# Patient Record
Sex: FEMALE | Race: WHITE | URBAN - METROPOLITAN AREA
[De-identification: names, ages, dates, MRNs, and addresses within clinical notes are randomized per-mention and may not be internally consistent; named-entity substitution may affect disease eponyms.]

---

## 2020-09-30 ENCOUNTER — LAB OUTSIDE AN ENCOUNTER (OUTPATIENT)
Dept: URBAN - METROPOLITAN AREA CLINIC 40 | Facility: CLINIC | Age: 40
End: 2020-09-30

## 2020-09-30 ENCOUNTER — OFFICE VISIT (OUTPATIENT)
Dept: URBAN - METROPOLITAN AREA CLINIC 40 | Facility: CLINIC | Age: 40
End: 2020-09-30
Payer: COMMERCIAL

## 2020-09-30 DIAGNOSIS — R11.2 NAUSEA & VOMITING: ICD-10-CM

## 2020-09-30 DIAGNOSIS — R10.13 EPIGASTRIC PAIN: ICD-10-CM

## 2020-09-30 PROCEDURE — G8427 DOCREV CUR MEDS BY ELIG CLIN: HCPCS | Performed by: INTERNAL MEDICINE

## 2020-09-30 PROCEDURE — G8417 CALC BMI ABV UP PARAM F/U: HCPCS | Performed by: INTERNAL MEDICINE

## 2020-09-30 PROCEDURE — 99203 OFFICE O/P NEW LOW 30 MIN: CPT | Performed by: INTERNAL MEDICINE

## 2020-09-30 PROCEDURE — G9903 PT SCRN TBCO ID AS NON USER: HCPCS | Performed by: INTERNAL MEDICINE

## 2020-09-30 RX ORDER — PANTOPRAZOLE SODIUM 40 MG/1
1 TABLET TABLET, DELAYED RELEASE ORAL ONCE A DAY
Qty: 90 | Refills: 0 | OUTPATIENT
Start: 2020-09-30

## 2020-09-30 RX ORDER — SUCRALFATE 1 G/1
1 TABLET ON AN EMPTY STOMACH TABLET ORAL TWICE A DAY
Qty: 60 | OUTPATIENT
Start: 2020-09-30 | End: 2020-10-30

## 2020-09-30 NOTE — PHYSICAL EXAM GASTROINTESTINAL
Abdomen , soft, mild epigastric tenderness  nondistended , no guarding or rigidity , no masses palpable , normal bowel sounds , Liver and Spleen , no hepatomegaly present , no hepatosplenomegaly , liver nontender , spleen not palpable

## 2020-09-30 NOTE — HPI-TODAY'S VISIT:
Ms. Farnsworth is a 40-year-old white female seen as a self-referral for abdominal pain.  Patient states for years she has had intermittent episodes of epigastric pain.  She describes it as a gnawing pain that is worse with meals.  She has had some nausea and vomiting as well as occasional dysphagia with solid foods that she has had to cough up.  She states that sometimes the pain can be burning.  She does use ibuprofen once a month around her menstrual cycle.  She saw an ear nose and throat doctor who did a laryngoscopy and apparently told her she had reflux changes.  She tried Prilosec for short period of time.  She has had a recent work-up for heart palpitations with cardiology.  Testing including an event monitor was unremarkable.  She does admit to diet does have increased tomatoes onions garlic and citrus in it.  She has tried to go more gluten-free of late and feels like this is been slightly helpful.

## 2020-09-30 NOTE — PHYSICAL EXAM NEUROLOGIC:
oriented to person, place and time , normal sensation , short and long term memory intact All other review of systems negative, except as noted in HPI

## 2020-10-01 ENCOUNTER — TELEPHONE ENCOUNTER (OUTPATIENT)
Dept: URBAN - METROPOLITAN AREA CLINIC 92 | Facility: CLINIC | Age: 40
End: 2020-10-01

## 2020-10-02 ENCOUNTER — OFFICE VISIT (OUTPATIENT)
Dept: URBAN - METROPOLITAN AREA SURGERY CENTER 30 | Facility: SURGERY CENTER | Age: 40
End: 2020-10-02
Payer: COMMERCIAL

## 2020-10-02 ENCOUNTER — OFFICE VISIT (OUTPATIENT)
Dept: URBAN - METROPOLITAN AREA SURGERY CENTER 30 | Facility: SURGERY CENTER | Age: 40
End: 2020-10-02

## 2020-10-02 DIAGNOSIS — R10.13 ABDOMINAL DISCOMFORT, EPIGASTRIC: ICD-10-CM

## 2020-10-02 DIAGNOSIS — R13.19 CERVICAL DYSPHAGIA: ICD-10-CM

## 2020-10-02 DIAGNOSIS — K31.89 ACQUIRED DEFORMITY OF DUODENUM: ICD-10-CM

## 2020-10-02 DIAGNOSIS — K22.8 COLUMNAR-LINED ESOPHAGUS: ICD-10-CM

## 2020-10-02 PROCEDURE — 43450 DILATE ESOPHAGUS 1/MULT PASS: CPT | Performed by: INTERNAL MEDICINE

## 2020-10-02 PROCEDURE — G8907 PT DOC NO EVENTS ON DISCHARG: HCPCS | Performed by: INTERNAL MEDICINE

## 2020-10-02 PROCEDURE — 43239 EGD BIOPSY SINGLE/MULTIPLE: CPT | Performed by: INTERNAL MEDICINE

## 2020-10-02 RX ORDER — PANTOPRAZOLE SODIUM 40 MG/1
1 TABLET TABLET, DELAYED RELEASE ORAL ONCE A DAY
Qty: 90 | Refills: 0 | Status: ACTIVE | COMMUNITY
Start: 2020-09-30

## 2020-10-02 RX ORDER — SUCRALFATE 1 G/1
1 TABLET ON AN EMPTY STOMACH TABLET ORAL TWICE A DAY
Qty: 60 | Status: ACTIVE | COMMUNITY
Start: 2020-09-30 | End: 2020-10-30

## 2020-10-28 ENCOUNTER — OFFICE VISIT (OUTPATIENT)
Dept: URBAN - METROPOLITAN AREA CLINIC 40 | Facility: CLINIC | Age: 40
End: 2020-10-28
Payer: COMMERCIAL

## 2020-10-28 DIAGNOSIS — K29.70 GASTRITIS: ICD-10-CM

## 2020-10-28 DIAGNOSIS — K20.90 ESOPHAGITIS DETERMINED BY ENDOSCOPY: ICD-10-CM

## 2020-10-28 PROCEDURE — G8417 CALC BMI ABV UP PARAM F/U: HCPCS | Performed by: INTERNAL MEDICINE

## 2020-10-28 PROCEDURE — G9903 PT SCRN TBCO ID AS NON USER: HCPCS | Performed by: INTERNAL MEDICINE

## 2020-10-28 PROCEDURE — G8483 FLU IMM NO ADMIN DOC REA: HCPCS | Performed by: INTERNAL MEDICINE

## 2020-10-28 PROCEDURE — G8427 DOCREV CUR MEDS BY ELIG CLIN: HCPCS | Performed by: INTERNAL MEDICINE

## 2020-10-28 PROCEDURE — 99214 OFFICE O/P EST MOD 30 MIN: CPT | Performed by: INTERNAL MEDICINE

## 2020-10-28 RX ORDER — PANTOPRAZOLE SODIUM 40 MG/1
1 TABLET TABLET, DELAYED RELEASE ORAL ONCE A DAY
Qty: 90 | Refills: 0 | Status: ACTIVE | COMMUNITY
Start: 2020-09-30

## 2020-10-28 RX ORDER — SUCRALFATE 1 G/1
1 TABLET ON AN EMPTY STOMACH TABLET ORAL TWICE A DAY
Qty: 60
Start: 2020-09-30

## 2020-10-28 RX ORDER — SUCRALFATE 1 G/1
1 TABLET ON AN EMPTY STOMACH TABLET ORAL TWICE A DAY
Qty: 60 | Status: ACTIVE | COMMUNITY
Start: 2020-09-30 | End: 2020-10-30

## 2020-10-28 RX ORDER — PANTOPRAZOLE SODIUM 40 MG/1
1 TABLET TABLET, DELAYED RELEASE ORAL ONCE A DAY
Qty: 90
Start: 2020-09-30

## 2020-10-28 NOTE — HPI-TODAY'S VISIT:
Ms. Farnsworth presents to clinic for follow-up.  She was last seen on September 30 where she was complaining of epigastric pain as well as occasional dysphagia and nausea.  She was started on pantoprazole as well as Carafate and an EGD was performed.  This was done on October 2.  She had an empiric dilation with 54 [place] Monique dilator.  Reflux esophagitis as well as reactive gastritis was noted.  Small bowel biopsies were negative for celiac sprue.  Patient presents today to state her dysphagia has improved.  Abdominal pain and nausea is better.  She has noted some mild constipation with taking the Carafate.  She has been watching her diet.

## 2020-11-02 ENCOUNTER — ERX REFILL RESPONSE (OUTPATIENT)
Dept: URBAN - METROPOLITAN AREA CLINIC 40 | Facility: CLINIC | Age: 40
End: 2020-11-02

## 2020-11-02 RX ORDER — SUCRALFATE 1 G/1
1 TABLET ON AN EMPTY STOMACH TABLET ORAL TWICE A DAY
Qty: 60 | Refills: 0

## 2021-01-27 ENCOUNTER — OFFICE VISIT (OUTPATIENT)
Dept: URBAN - METROPOLITAN AREA CLINIC 40 | Facility: CLINIC | Age: 41
End: 2021-01-27
Payer: COMMERCIAL

## 2021-01-27 ENCOUNTER — WEB ENCOUNTER (OUTPATIENT)
Dept: URBAN - METROPOLITAN AREA CLINIC 40 | Facility: CLINIC | Age: 41
End: 2021-01-27

## 2021-01-27 DIAGNOSIS — K59.01 CONSTIPATION: ICD-10-CM

## 2021-01-27 DIAGNOSIS — K20.90 ESOPHAGITIS DETERMINED BY ENDOSCOPY: ICD-10-CM

## 2021-01-27 DIAGNOSIS — K29.70 GASTRITIS: ICD-10-CM

## 2021-01-27 PROBLEM — 14760008 CONSTIPATION: Status: ACTIVE | Noted: 2021-01-27

## 2021-01-27 PROCEDURE — G8417 CALC BMI ABV UP PARAM F/U: HCPCS | Performed by: INTERNAL MEDICINE

## 2021-01-27 PROCEDURE — G8427 DOCREV CUR MEDS BY ELIG CLIN: HCPCS | Performed by: INTERNAL MEDICINE

## 2021-01-27 PROCEDURE — G8483 FLU IMM NO ADMIN DOC REA: HCPCS | Performed by: INTERNAL MEDICINE

## 2021-01-27 PROCEDURE — 99213 OFFICE O/P EST LOW 20 MIN: CPT | Performed by: INTERNAL MEDICINE

## 2021-01-27 PROCEDURE — G9903 PT SCRN TBCO ID AS NON USER: HCPCS | Performed by: INTERNAL MEDICINE

## 2021-01-27 RX ORDER — PANTOPRAZOLE SODIUM 40 MG/1
1 TABLET TABLET, DELAYED RELEASE ORAL ONCE A DAY
Qty: 90 | Refills: 1
Start: 2020-09-30

## 2021-01-27 RX ORDER — SUCRALFATE 1 G/1
1 TABLET ON AN EMPTY STOMACH TABLET ORAL TWICE A DAY
Qty: 60 | Refills: 0 | COMMUNITY

## 2021-01-27 RX ORDER — PANTOPRAZOLE SODIUM 40 MG/1
1 TABLET TABLET, DELAYED RELEASE ORAL ONCE A DAY
Qty: 90 | COMMUNITY
Start: 2020-09-30

## 2021-01-27 NOTE — HPI-TODAY'S VISIT:
Ms. Farnsworth presents to clinic for 3-month follow-up.  We saw her last in October after EGD showed reflux esophagitis and reactive gastritis.  Patient has done well since her EGD as well as her dilatation with 54 [place] Monique dilator.  At her last appointment she had noted some constipation so we advised her to taper off of the Carafate that she was on and continue on her pantoprazole.  Patient states that as long as she sticks to her diet she does relatively well.  If she has dietary indiscretion she will have reflux.  The burning abdominal pain she was having has resolved.  She denies any nausea or vomiting.  She has had no further swallowing issues.  She still notes that she is a little on the constipated side.  She feels like she drinks water and an adequate amount.  There is no blood in the stool.  She states at baseline she tends to be more the looser stool side.

## 2021-06-04 ENCOUNTER — ERX REFILL RESPONSE (OUTPATIENT)
Dept: URBAN - METROPOLITAN AREA CLINIC 40 | Facility: CLINIC | Age: 41
End: 2021-06-04

## 2021-06-04 RX ORDER — PANTOPRAZOLE SODIUM 40 MG/1
1 TABLET TABLET, DELAYED RELEASE ORAL ONCE A DAY
Qty: 90 | Refills: 0

## 2021-07-27 ENCOUNTER — OFFICE VISIT (OUTPATIENT)
Dept: URBAN - METROPOLITAN AREA CLINIC 40 | Facility: CLINIC | Age: 41
End: 2021-07-27
Payer: COMMERCIAL

## 2021-07-27 DIAGNOSIS — Z79.899 ENCOUNTER FOR LONG-TERM (CURRENT) DRUG USE: ICD-10-CM

## 2021-07-27 DIAGNOSIS — K29.70 GASTRITIS: ICD-10-CM

## 2021-07-27 DIAGNOSIS — R10.13 DYSPEPSIA: ICD-10-CM

## 2021-07-27 PROBLEM — 4556007 GASTRITIS: Status: ACTIVE | Noted: 2020-10-28

## 2021-07-27 PROCEDURE — 99214 OFFICE O/P EST MOD 30 MIN: CPT | Performed by: INTERNAL MEDICINE

## 2021-07-27 RX ORDER — PANTOPRAZOLE SODIUM 40 MG/1
1 TABLET TABLET, DELAYED RELEASE ORAL ONCE A DAY
Qty: 90 | Refills: 0 | Status: ACTIVE | COMMUNITY

## 2021-07-27 RX ORDER — PANTOPRAZOLE SODIUM 40 MG/1
1 TABLET TABLET, DELAYED RELEASE ORAL ONCE A DAY
Qty: 90 | Refills: 0

## 2021-07-27 RX ORDER — SUCRALFATE 1 G/1
1 TABLET ON AN EMPTY STOMACH TABLET ORAL TWICE A DAY
Qty: 60 | Refills: 0 | COMMUNITY

## 2021-07-27 NOTE — PHYSICAL EXAM SKIN:
no rashes , no suspicious lesions , no areas of discoloration , no jaundice present , good turgor , no masses , no tenderness on palpation
Detail Level: Detailed
Size Of Lesion In Cm (Optional): 0

## 2021-07-27 NOTE — HPI-TODAY'S VISIT:
Ms. Farnsworth presents to clinic for 6-month follow-up.  She was last seen on January 27.  Recall she had an EGD in October 2020 due to complaints of dysphagia and dyspepsia.  At that appointment she had reflux esophagitis and reactive gastritis noted on pathology.  She also had a empiric dilatation with 54 [place] Monique.  She is continuing to do well on pantoprazole.  She tried to come off the medication but was unsuccessful.  She is using it daily without any breakthrough unless she has dietary indiscretions.  She states when she does that it responds to taking the Carafate.  She thinks that there are certain foods that she has noted sensitivity with.  As for her bowels she is no longer is had to use MiraLAX and is having a bowel movement regularly daily.  She has noted some increased gas and bloating.  She also notes when she eats fatty food she occasionally has some midepigastric discomfort.

## 2021-07-28 LAB
BUN/CREATININE RATIO: 18
BUN: 13
CARBON DIOXIDE, TOTAL: 23
CHLORIDE: 103
CREATININE: 0.72
EGFR IF AFRICN AM: 122
EGFR IF NONAFRICN AM: 106
GLUCOSE: 96
MAGNESIUM: 2.1
POTASSIUM: 4.6
SODIUM: 143
VITAMIN B12: 1137
VITAMIN D, 25-HYDROXY: 36.3

## 2022-01-25 ENCOUNTER — OFFICE VISIT (OUTPATIENT)
Dept: URBAN - METROPOLITAN AREA CLINIC 40 | Facility: CLINIC | Age: 42
End: 2022-01-25
Payer: COMMERCIAL

## 2022-01-25 DIAGNOSIS — K80.20 CHOLELITHIASIS: ICD-10-CM

## 2022-01-25 DIAGNOSIS — K20.90 ESOPHAGITIS DETERMINED BY ENDOSCOPY: ICD-10-CM

## 2022-01-25 PROBLEM — 266474003 CHOLELITHIASIS: Status: ACTIVE | Noted: 2022-01-25

## 2022-01-25 PROCEDURE — 99214 OFFICE O/P EST MOD 30 MIN: CPT | Performed by: INTERNAL MEDICINE

## 2022-01-25 RX ORDER — SUCRALFATE 1 G/1
1 TABLET ON AN EMPTY STOMACH TABLET ORAL TWICE A DAY
Qty: 60 | Refills: 0 | Status: ACTIVE | COMMUNITY

## 2022-01-25 RX ORDER — PANTOPRAZOLE SODIUM 40 MG/1
1 TABLET TABLET, DELAYED RELEASE ORAL ONCE A DAY
Qty: 90 | Refills: 0 | Status: ACTIVE | COMMUNITY

## 2022-01-25 RX ORDER — PANTOPRAZOLE SODIUM 40 MG/1
1 TABLET TABLET, DELAYED RELEASE ORAL ONCE A DAY
Qty: 90 | Refills: 0

## 2022-01-25 NOTE — HPI-TODAY'S VISIT:
Ms. Farnsworth presents to clinic for follow-up.  She was last seen in July of last year.  We are following her for esophagitis and gastritis noted on EGD in fall 2020.  Patient has  gotten down to taking her pantoprazole as needed.  She denies any breakthrough heartburn or dysphagia.  At her appointment in July she had noted occasional right-sided abdominal pain and dyspepsia.  Right upper quad ultrasound was offered at that time and declined.  She was also given FODMAP diet.  Patient continues to have intermittent episodes of right-sided pain.  She ended up getting an ultrasound in [place].  She brings the report that does show cholelithiasis without evidence of cholecystitis or biliary dilatation..  She also had a pelvic ultrasound that showed no abnormalities.  Patient states the FODMAP diet was helpful.  She is trying to watch her diet and in doing so he attacks have decreased in frequency.  She states when she has the attack she will have loose stools as well as occasional nausea.  The pain can radiate from the right side into her back between her shoulder blades.

## 2022-07-26 ENCOUNTER — OFFICE VISIT (OUTPATIENT)
Dept: URBAN - METROPOLITAN AREA CLINIC 40 | Facility: CLINIC | Age: 42
End: 2022-07-26

## 2024-04-01 ENCOUNTER — OV EP (OUTPATIENT)
Dept: URBAN - METROPOLITAN AREA CLINIC 40 | Facility: CLINIC | Age: 44
End: 2024-04-01
Payer: COMMERCIAL

## 2024-04-01 ENCOUNTER — LAB (OUTPATIENT)
Dept: URBAN - METROPOLITAN AREA CLINIC 40 | Facility: CLINIC | Age: 44
End: 2024-04-01

## 2024-04-01 VITALS
TEMPERATURE: 97.7 F | BODY MASS INDEX: 29.92 KG/M2 | HEART RATE: 78 BPM | HEIGHT: 70 IN | SYSTOLIC BLOOD PRESSURE: 128 MMHG | WEIGHT: 209 LBS | DIASTOLIC BLOOD PRESSURE: 74 MMHG

## 2024-04-01 DIAGNOSIS — R10.13 EPIGASTRIC ABDOMINAL PAIN: ICD-10-CM

## 2024-04-01 DIAGNOSIS — R14.0 BLOATING AND GAS: ICD-10-CM

## 2024-04-01 DIAGNOSIS — R13.10 DYSPHAGIA: ICD-10-CM

## 2024-04-01 PROCEDURE — 99214 OFFICE O/P EST MOD 30 MIN: CPT | Performed by: INTERNAL MEDICINE

## 2024-04-01 RX ORDER — SUCRALFATE 1 G/1
1 TABLET ON AN EMPTY STOMACH TABLET ORAL TWICE A DAY
Qty: 60 | Refills: 0 | Status: ON HOLD | COMMUNITY

## 2024-04-01 RX ORDER — PANTOPRAZOLE SODIUM 40 MG/1
1 TABLET TABLET, DELAYED RELEASE ORAL ONCE A DAY
Qty: 90 | Refills: 0 | Status: ON HOLD | COMMUNITY

## 2024-04-01 RX ORDER — PANTOPRAZOLE SODIUM 40 MG/1
1 TABLET TABLET, DELAYED RELEASE ORAL ONCE A DAY
Qty: 90 | Refills: 0

## 2024-04-01 RX ORDER — SUCRALFATE 1 G/1
1 TABLET DISSOLVED IN 4 OUNCES OF WATER TABLET ORAL TWICE A DAY
Qty: 60 | Refills: 1

## 2024-04-01 NOTE — HPI-TODAY'S VISIT:
Ms. Farnsworth presents to clinic for follow-up accompanied by her mother.  She was last seen in the office in January 2022.  At that appointment she was having issues with abdominal pain that was found to be secondary to cholelithiasis.  Patient underwent cholecystectomy in [facility] in March 2022 and did well up until recently.  She states that she had a bite from a cat on February 1.  She was prescribed diclofenac as well as antibiotics.  After couple of days she started to note burning in the midepigastric area.  She noted globus sensation.  She was seen by urgent care who told her she had thrush and treated her as such.  She states despite that she has had issues with dysphagia with solids and pills.  She is currently been taking over-the-counter PPI but has felt worse over the last month and a half.  Recall she had an EGD in October 2020 where she had reactive gastritis and mild esophagitis.  She was treated with pantoprazole and Carafate back then.  She is been off that medication.  She notes bloating as well as cough after eating.  When she for started her antibiotic she had diarrhea.  She then noted some constipation.  Bowels are gravitating back towards baseline.  There is no blood in the stool or melena.

## 2024-04-01 NOTE — PHYSICAL EXAM GASTROINTESTINAL
Abdomen , soft,  mild MAGNO tenderness, nondistended , no guarding or rigidity , no masses palpable , normal bowel sounds , Liver and Spleen , no hepatomegaly present , no hepatosplenomegaly , liver nontender , spleen not palpable

## 2024-04-03 ENCOUNTER — LAB (OUTPATIENT)
Dept: URBAN - METROPOLITAN AREA CLINIC 4 | Facility: CLINIC | Age: 44
End: 2024-04-03
Payer: COMMERCIAL

## 2024-04-03 ENCOUNTER — EGD (OUTPATIENT)
Dept: URBAN - METROPOLITAN AREA SURGERY CENTER 30 | Facility: SURGERY CENTER | Age: 44
End: 2024-04-03
Payer: COMMERCIAL

## 2024-04-03 DIAGNOSIS — K31.89 OTHER DISEASES OF STOMACH AND DUODENUM: ICD-10-CM

## 2024-04-03 DIAGNOSIS — K29.70 GASTRITIS, UNSPECIFIED, WITHOUT BLEEDING: ICD-10-CM

## 2024-04-03 DIAGNOSIS — K21.9 GASTRO-ESOPHAGEAL REFLUX DISEASE WITHOUT ESOPHAGITIS: ICD-10-CM

## 2024-04-03 DIAGNOSIS — R10.13 ABDOMINAL DISCOMFORT, EPIGASTRIC: ICD-10-CM

## 2024-04-03 DIAGNOSIS — K21.9 ACID REFLUX: ICD-10-CM

## 2024-04-03 PROCEDURE — 88312 SPECIAL STAINS GROUP 1: CPT | Performed by: PATHOLOGY

## 2024-04-03 PROCEDURE — 43239 EGD BIOPSY SINGLE/MULTIPLE: CPT | Performed by: INTERNAL MEDICINE

## 2024-04-03 PROCEDURE — 88305 TISSUE EXAM BY PATHOLOGIST: CPT | Performed by: PATHOLOGY

## 2024-04-03 RX ORDER — PANTOPRAZOLE SODIUM 40 MG/1
1 TABLET TABLET, DELAYED RELEASE ORAL ONCE A DAY
Qty: 90 | Refills: 0 | Status: ACTIVE | COMMUNITY

## 2024-04-03 RX ORDER — SUCRALFATE 1 G/1
1 TABLET DISSOLVED IN 4 OUNCES OF WATER TABLET ORAL TWICE A DAY
Qty: 60 | Refills: 1 | Status: ACTIVE | COMMUNITY

## 2024-05-01 ENCOUNTER — OFFICE VISIT (OUTPATIENT)
Dept: URBAN - METROPOLITAN AREA CLINIC 40 | Facility: CLINIC | Age: 44
End: 2024-05-01
Payer: COMMERCIAL

## 2024-05-01 ENCOUNTER — DASHBOARD ENCOUNTERS (OUTPATIENT)
Age: 44
End: 2024-05-01

## 2024-05-01 VITALS
HEIGHT: 70 IN | TEMPERATURE: 97.7 F | SYSTOLIC BLOOD PRESSURE: 130 MMHG | DIASTOLIC BLOOD PRESSURE: 82 MMHG | BODY MASS INDEX: 28.92 KG/M2 | WEIGHT: 202 LBS | HEART RATE: 87 BPM

## 2024-05-01 DIAGNOSIS — K29.70 GASTRITIS: ICD-10-CM

## 2024-05-01 DIAGNOSIS — R10.13 EPIGASTRIC ABDOMINAL PAIN: ICD-10-CM

## 2024-05-01 PROCEDURE — 99214 OFFICE O/P EST MOD 30 MIN: CPT | Performed by: PHYSICIAN ASSISTANT

## 2024-05-01 RX ORDER — SUCRALFATE 1 G/1
1 TABLET DISSOLVED IN 4 OUNCES OF WATER TABLET ORAL TWICE A DAY
Qty: 60 | Refills: 1 | Status: ON HOLD | COMMUNITY

## 2024-05-01 RX ORDER — PANTOPRAZOLE SODIUM 40 MG/1
1 TABLET TABLET, DELAYED RELEASE ORAL ONCE A DAY
Qty: 90 | Refills: 0 | Status: ACTIVE | COMMUNITY

## 2024-05-01 RX ORDER — PANTOPRAZOLE SODIUM 40 MG/1
1 TABLET TABLET, DELAYED RELEASE ORAL ONCE A DAY
Qty: 30 TABLET | Refills: 3

## 2024-07-27 PROBLEM — 266433003: Status: ACTIVE | Noted: 2024-07-27

## 2024-08-01 ENCOUNTER — OFFICE VISIT (OUTPATIENT)
Dept: URBAN - METROPOLITAN AREA CLINIC 74 | Facility: CLINIC | Age: 44
End: 2024-08-01
Payer: COMMERCIAL

## 2024-08-01 VITALS
HEART RATE: 76 BPM | DIASTOLIC BLOOD PRESSURE: 64 MMHG | BODY MASS INDEX: 30.75 KG/M2 | SYSTOLIC BLOOD PRESSURE: 114 MMHG | TEMPERATURE: 99 F | HEIGHT: 69 IN | WEIGHT: 207.6 LBS

## 2024-08-01 DIAGNOSIS — K29.60 ADENOPAPILLOMATOSIS GASTRICA: ICD-10-CM

## 2024-08-01 DIAGNOSIS — Z79.899 LONG-TERM CURRENT USE OF PROTON PUMP INHIBITOR THERAPY: ICD-10-CM

## 2024-08-01 DIAGNOSIS — K21.00 CHRONIC REFLUX ESOPHAGITIS: ICD-10-CM

## 2024-08-01 PROCEDURE — 99213 OFFICE O/P EST LOW 20 MIN: CPT | Performed by: PHYSICIAN ASSISTANT

## 2024-08-01 RX ORDER — HYDROXYZINE HYDROCHLORIDE 25 MG/1
TAKE ONE TABLET BY MOUTH TWICE A DAY AS NEEDED FOR ANXIETY TABLET, FILM COATED ORAL
Qty: 60 UNSPECIFIED | Refills: 2 | Status: ACTIVE | COMMUNITY

## 2024-08-01 RX ORDER — PANTOPRAZOLE SODIUM 40 MG/1
1 TABLET TABLET, DELAYED RELEASE ORAL ONCE A DAY
Qty: 30 TABLET | Refills: 3 | Status: ACTIVE | COMMUNITY

## 2024-08-01 RX ORDER — PANTOPRAZOLE SODIUM 40 MG/1
1 TABLET TABLET, DELAYED RELEASE ORAL ONCE A DAY
Qty: 90 TABLET | Refills: 1

## 2024-08-01 RX ORDER — SUCRALFATE 1 G/1
1 TABLET DISSOLVED IN 4 OUNCES OF WATER TABLET ORAL TWICE A DAY
Qty: 60 | Refills: 1 | Status: ON HOLD | COMMUNITY

## 2024-08-01 NOTE — HPI-TODAY'S VISIT:
The patient is a 44-year-old female with past medical history as noted below known to Dr. Walton is presenting to our clinic for follow-up appointment she has been treated with Pantoprazole 40 mg one tablet daily and she completed her Carafate 1 g every 12 hours therapy with lifestyle modification to improve her symptoms. She is no longer on NSAID'S. She is doing well and she denies any GI issues today.   Procedure: -- EGD with biopsy on 04/03/2024 by Dr. Flores noted Z-line variable, 39 cm from the incisors.  Gastritis.  No gross lesion entire examined duodenum.  Biopsy small bowel with no significant abnormality.  Stomach with mild chemical and reactive gastropathy.  No H.pylori organisms or intestinal metaplasia.  Esophagus with reflux type changes.  No Tyson's esophagus or eosinophilic esophagitis.

## 2024-09-28 NOTE — PHYSICAL EXAM CHEST:
no lesions,  no deformities,  no traumatic injuries,  no significant scars are present,  chest wall non-tender,  no masses present, breathing is unlabored without accessory muscle use,normal breath sounds
Discarded in the usual manner

## 2025-02-06 ENCOUNTER — OFFICE VISIT (OUTPATIENT)
Dept: URBAN - METROPOLITAN AREA CLINIC 74 | Facility: CLINIC | Age: 45
End: 2025-02-06
Payer: COMMERCIAL

## 2025-02-06 VITALS
WEIGHT: 214.6 LBS | TEMPERATURE: 98.2 F | DIASTOLIC BLOOD PRESSURE: 80 MMHG | BODY MASS INDEX: 31.78 KG/M2 | HEIGHT: 69 IN | SYSTOLIC BLOOD PRESSURE: 114 MMHG | HEART RATE: 99 BPM

## 2025-02-06 DIAGNOSIS — K21.00 CHRONIC REFLUX ESOPHAGITIS: ICD-10-CM

## 2025-02-06 DIAGNOSIS — K29.70 GASTRITIS: ICD-10-CM

## 2025-02-06 DIAGNOSIS — R14.0 BLOATING SYMPTOM: ICD-10-CM

## 2025-02-06 DIAGNOSIS — Z79.899 LONG-TERM CURRENT USE OF PROTON PUMP INHIBITOR THERAPY: ICD-10-CM

## 2025-02-06 PROCEDURE — 99214 OFFICE O/P EST MOD 30 MIN: CPT | Performed by: PHYSICIAN ASSISTANT

## 2025-02-06 RX ORDER — PANTOPRAZOLE SODIUM 40 MG/1
1 TABLET TABLET, DELAYED RELEASE ORAL ONCE A DAY
Qty: 90 TABLET | Refills: 1 | Status: ACTIVE | COMMUNITY

## 2025-02-06 RX ORDER — PANTOPRAZOLE SODIUM 40 MG/1
1 TABLET TABLET, DELAYED RELEASE ORAL ONCE A DAY
Qty: 90 TABLET | Refills: 1

## 2025-02-06 RX ORDER — AMITRIPTYLINE HYDROCHLORIDE 10 MG/1
1 TABLET AT BEDTIME TABLET, FILM COATED ORAL ONCE A DAY
Qty: 90 TABLET | Refills: 1 | OUTPATIENT
Start: 2025-02-06

## 2025-02-06 RX ORDER — HYDROXYZINE HYDROCHLORIDE 25 MG/1
TAKE ONE TABLET BY MOUTH TWICE A DAY AS NEEDED FOR ANXIETY TABLET, FILM COATED ORAL
Qty: 60 UNSPECIFIED | Refills: 2 | Status: ACTIVE | COMMUNITY

## 2025-02-06 RX ORDER — SUCRALFATE 1 G/1
1 TABLET DISSOLVED IN 4 OUNCES OF WATER TABLET ORAL TWICE A DAY
Qty: 60 | Refills: 1 | Status: ACTIVE | COMMUNITY

## 2025-02-06 NOTE — HPI-TODAY'S VISIT:
The patient is a 44-year-old female with past medical history as noted below known to Dr. Walton is presenting to our clinic for 6 months follow-up appointment. She has been treated with Pantoprazole 40 mg one tablet daily as needed and Carafate 1 g every 12 hours only as needed. The patient stated that she has been going to a lot of stress recently at home with her  having major abdominal surgery with splenectomy.  Her grandmother is diagnosed with dementia and she is at nursing home and also her 's mother is diagnosed with end-stage liver disease and she is in hospice care.  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  That is why due to the stress she has noted recently that she has more follow-up of reflux, abdominal bloating, and discomfort.  No changes in bowel habits.  Procedure: -- EGD with biopsy on 04/03/2024 by Dr. Flores noted Z-line variable, 39 cm from the incisors.  Gastritis.  No gross lesion entire examined duodenum.  Biopsy small bowel with no significant abnormality.  Stomach with mild chemical and reactive gastropathy.  No H.pylori organisms or intestinal metaplasia.  Esophagus with reflux type changes.  No Tyson's esophagus or eosinophilic esophagitis.

## 2025-08-06 ENCOUNTER — OFFICE VISIT (OUTPATIENT)
Dept: URBAN - METROPOLITAN AREA CLINIC 74 | Facility: CLINIC | Age: 45
End: 2025-08-06
Payer: COMMERCIAL

## 2025-08-06 DIAGNOSIS — K21.00 CHRONIC REFLUX ESOPHAGITIS: ICD-10-CM

## 2025-08-06 DIAGNOSIS — Z79.899 LONG-TERM CURRENT USE OF PROTON PUMP INHIBITOR THERAPY: ICD-10-CM

## 2025-08-06 DIAGNOSIS — R19.4 CHANGE IN BOWEL HABITS: ICD-10-CM

## 2025-08-06 DIAGNOSIS — R14.0 BLOATING SYMPTOM: ICD-10-CM

## 2025-08-06 DIAGNOSIS — K29.70 GASTRITIS: ICD-10-CM

## 2025-08-06 PROCEDURE — 99214 OFFICE O/P EST MOD 30 MIN: CPT | Performed by: PHYSICIAN ASSISTANT

## 2025-08-06 RX ORDER — HYDROXYZINE HYDROCHLORIDE 25 MG/1
TAKE ONE TABLET BY MOUTH TWICE A DAY AS NEEDED FOR ANXIETY TABLET, FILM COATED ORAL
Qty: 60 UNSPECIFIED | Refills: 2 | Status: ACTIVE | COMMUNITY

## 2025-08-06 RX ORDER — PANTOPRAZOLE SODIUM 40 MG/1
1 TABLET TABLET, DELAYED RELEASE ORAL ONCE A DAY
Qty: 90 TABLET | Refills: 1 | Status: ACTIVE | COMMUNITY

## 2025-08-19 LAB — PANCREATIC ELASTASE, FECAL: >800
